# Patient Record
Sex: FEMALE | URBAN - METROPOLITAN AREA
[De-identification: names, ages, dates, MRNs, and addresses within clinical notes are randomized per-mention and may not be internally consistent; named-entity substitution may affect disease eponyms.]

---

## 2020-08-12 ENCOUNTER — APPOINTMENT (RX ONLY)
Dept: URBAN - METROPOLITAN AREA CLINIC 84 | Facility: CLINIC | Age: 80
Setting detail: DERMATOLOGY
End: 2020-08-12

## 2020-08-12 VITALS — TEMPERATURE: 98.6 F

## 2020-08-12 DIAGNOSIS — L29.89 OTHER PRURITUS: ICD-10-CM

## 2020-08-12 DIAGNOSIS — L29.8 OTHER PRURITUS: ICD-10-CM

## 2020-08-12 PROCEDURE — ? TREATMENT REGIMEN

## 2020-08-12 PROCEDURE — ? COUNSELING

## 2020-08-12 PROCEDURE — 99203 OFFICE O/P NEW LOW 30 MIN: CPT

## 2020-08-12 PROCEDURE — ? ORDER TESTS

## 2020-08-12 PROCEDURE — ? PRESCRIPTION

## 2020-08-12 PROCEDURE — ? ADDITIONAL NOTES

## 2020-08-12 RX ORDER — TRIAMCINOLONE ACETONIDE 1 MG/G
CREAM TOPICAL BID
Qty: 1 | Refills: 3 | Status: ERX | COMMUNITY
Start: 2020-08-12

## 2020-08-12 RX ORDER — HYDROXYZINE HYDROCHLORIDE 10 MG/1
TABLET, FILM COATED ORAL QHS
Qty: 30 | Refills: 2 | Status: ERX | COMMUNITY
Start: 2020-08-12

## 2020-08-12 RX ADMIN — TRIAMCINOLONE ACETONIDE: 1 CREAM TOPICAL at 00:00

## 2020-08-12 RX ADMIN — HYDROXYZINE HYDROCHLORIDE: 10 TABLET, FILM COATED ORAL at 00:00

## 2020-08-12 NOTE — PROCEDURE: TREATMENT REGIMEN
Initiate Treatment: - Dove sensitive soap \\n- Moisturize twice daily with cereve\\n- atarax nightly\\n- TAC cream BID \\n
Continue Regimen: - Benadryl qAM\\n
Detail Level: Zone
Plan: - wlll check labs\\n- no dermatitis noted; no evidence of scabies\\n- positive mild dermatographism\\n- labs for pruritus to be checked\\n

## 2020-09-16 ENCOUNTER — APPOINTMENT (RX ONLY)
Dept: URBAN - METROPOLITAN AREA CLINIC 84 | Facility: CLINIC | Age: 80
Setting detail: DERMATOLOGY
End: 2020-09-16

## 2020-09-16 DIAGNOSIS — L29.89 OTHER PRURITUS: ICD-10-CM | Status: INADEQUATELY CONTROLLED

## 2020-09-16 DIAGNOSIS — L29.8 OTHER PRURITUS: ICD-10-CM | Status: INADEQUATELY CONTROLLED

## 2020-09-16 PROCEDURE — ? TREATMENT REGIMEN

## 2020-09-16 PROCEDURE — 99214 OFFICE O/P EST MOD 30 MIN: CPT

## 2020-09-16 PROCEDURE — ? COUNSELING

## 2020-09-16 PROCEDURE — ? PHOTO-DOCUMENTATION

## 2020-09-16 ASSESSMENT — LOCATION DETAILED DESCRIPTION DERM: LOCATION DETAILED: LEFT LATERAL SUPERIOR CHEST

## 2020-09-16 ASSESSMENT — LOCATION ZONE DERM: LOCATION ZONE: TRUNK

## 2020-09-16 ASSESSMENT — LOCATION SIMPLE DESCRIPTION DERM: LOCATION SIMPLE: CHEST

## 2024-08-27 NOTE — PROCEDURE: TREATMENT REGIMEN
Chief Complaint   Patient presents with    Blood Draw     Labs drawn via CVC by RN in lab     Labs collected from CVC by RN, line flushed with saline and heparin. Pt reports last taking medications today at 0800 but was not able to confirm if this included Sirolimus. Pharm D and Provider notified. Vitals taken. Pt checked in for appointment(s).     Eli Truong RN  
Initiate Treatment: - Dove sensitive soap \\n- Moisturize twice daily with cereve\\n- atarax nightly\\n- TAC cream BID\\n\\n- Benadryl qAM
Detail Level: Zone